# Patient Record
(demographics unavailable — no encounter records)

---

## 2024-11-19 NOTE — ASSESSMENT
[FreeTextEntry1] :   The EKG illustrates sinus rhythm, rate of 85 bpm, occasional ectopic ventricular beats, left atrial enlargement pattern, low voltage bipolar leads.  No acute changes;  In summary, patient is a very pleasant 66-year-old woman who came to our office for cardiac evaluation over 1 year ago with history of hypertension, heart murmur, mild mitral valve prolapse, and longstanding history of rheumatoid arthritis as well as chronic obesity.    She had been treated for hyperlipidemia and there is some family history for stroke and heart disease for one sister who  at an early age of an apparent MI.    Pt. ambulates with a cane because of a history of rheumatoid arthritis and bilateral knee arthritis.  At one time, she was prone to falling and now uses a cane.  She is trying to be more cognizant of her diet; but still having trouble losing any meaningful weight;  Recent pharmacologic myocardial perfusion stress test (2024) -showed normal myocardial perfusion without any evidence of ischemia or defects with preserved left ventricular systolic ejection fraction in the range of 66%;  Presently, she appears cardiac and hemodynamically stable;    Plan:  Patient reassured;  Appears to have stable cardiac pattern at this time and stable blood pressure on current medical regimen.;  No additional cardiac workup indicated at this time;  Counseled patient again on importance of trying to adhere to a sensible low-carb low-sodium weight reducing diet;  Regular checkups and laboratory blood test with primary care encouraged;  Patient to report any untoward chest symptoms between now and next visit within 5 to 6 months;

## 2024-11-19 NOTE — PHYSICAL EXAM
[Well Developed] : well developed [No Acute Distress] : no acute distress [Obese] : obese [Normal Conjunctiva] : normal conjunctiva [Normal Venous Pressure] : normal venous pressure [No Carotid Bruit] : no carotid bruit [Normal S1, S2] : normal S1, S2 [No Rub] : no rub [No Gallop] : no gallop [Murmur] : murmur [Clear Lung Fields] : clear lung fields [Good Air Entry] : good air entry [No Respiratory Distress] : no respiratory distress  [Soft] : abdomen soft [Non Tender] : non-tender [No Masses/organomegaly] : no masses/organomegaly [Abnormal Gait] : abnormal gait [No Cyanosis] : no cyanosis [No Clubbing] : no clubbing [No Rash] : no rash [No Skin Lesions] : no skin lesions [Moves all extremities] : moves all extremities [No Focal Deficits] : no focal deficits [Normal Speech] : normal speech [Alert and Oriented] : alert and oriented [Normal memory] : normal memory [de-identified] : Obese white female ambulating with a cane; [de-identified] : Grade I/VI systolic murmur [de-identified] : Obese, nontender; [de-identified] : ambulates with cane [de-identified] : Wearing compression stockings B/L. Trace pretibial edema with L>R

## 2024-11-19 NOTE — HISTORY OF PRESENT ILLNESS
[FreeTextEntry1] : Looking forward at some point to visit her son and grandkids in [place];  Reports a recent laboratory blood test with primary care including lipid panel were "favorable";    Pharmacologic nuclear stress test from January 23, 2024 demonstrates-no symptoms of chest pain; normal myocardial perfusion without any significant evidence of reversible ischemia or defects-i.e. negative for ischemia; LVEF in the range of 66%;   Carotid Doppler (9/19/2023):  There is mild stenosis (1-19%) in the both the BERTA and LICA.  The vertebral arteries demonstrate antegrade flow bilaterally.   Transthoracic Echocardiogram (9/19/2023):  There is moderate LVH with normal LV systolic function; LVEF of 55 to 60%.  The basal inferoseptal segment and basal inferior segment are hypokinetic.  The left atrium is mildly dilated and right atrium is mildly dilated.  The ascending aorta, aortic arch and aortic root appear normal in size. Trace AR, TR, and MT.  Estimated pulmonary artery systolic pressure is 35 mmHg, consistent with mild pulmonary HTN.  There is no pericardial effusion noted.  Most recent fasting labs from (4/4/2023):  Total Cholesterol 192, , HDL 77, Triglycerides 66, Glucose 88, Potassium 4.5, Creatinine 0.78, AST 19, ALT 17.

## 2024-11-19 NOTE — REASON FOR VISIT
[FreeTextEntry1] : The patient is a very pleasant 65-year-old woman who came to our office for cardiac consultation last year with history of hypertension, heart murmur, mild mitral valve prolapse, and longstanding history of rheumatoid arthritis as well as chronic obesity.  (She was actually seen in our office approximately 12 years ago for general cardiac checkup)    She had been treated for hyperlipidemia and there is some family history for stroke and heart disease for one sister who  at an early age of an apparent MI.    She had been experiencing some swelling of the lower extremities earlier this year and for that reason, her PCP (Dr. Patrick Granados) had started her on Dyazide 37.5-25 mg daily.  She reports the combination of taking this medication and using compression stockings daily has helped considerably with improving her LE edema.   Patient also has been better with cutting back on sodium rich foods and keeping well hydrated.  Her blood pressure has also remained under good control.    She had undergone some cardiovascular testing including carotid duplex study and echocardiogram this past fall and more recently underwent a nuclear stress test with pharmacologic protocol.  She returns to the office to discuss those results;   She ambulates with a cane because of a history of rheumatoid arthritis and bilateral knee arthritis.  At one time, she was prone to falling and now uses a cane.    Patient reports feeling overall good and denies exertional chest pain, orthopnea, PND, palpitations, presyncope, syncope.

## 2025-05-01 NOTE — PHYSICAL EXAM
[Well Developed] : well developed [No Acute Distress] : no acute distress [Obese] : obese [Normal Conjunctiva] : normal conjunctiva [Normal Venous Pressure] : normal venous pressure [No Carotid Bruit] : no carotid bruit [Normal S1, S2] : normal S1, S2 [No Rub] : no rub [No Gallop] : no gallop [Murmur] : murmur [Clear Lung Fields] : clear lung fields [Good Air Entry] : good air entry [No Respiratory Distress] : no respiratory distress  [Soft] : abdomen soft [Non Tender] : non-tender [No Masses/organomegaly] : no masses/organomegaly [Abnormal Gait] : abnormal gait [No Cyanosis] : no cyanosis [No Clubbing] : no clubbing [No Rash] : no rash [No Skin Lesions] : no skin lesions [Moves all extremities] : moves all extremities [No Focal Deficits] : no focal deficits [Normal Speech] : normal speech [Alert and Oriented] : alert and oriented [Normal memory] : normal memory [de-identified] : Obese white female ambulating with a cane; [de-identified] : Grade I/VI systolic murmur [de-identified] : Obese, nontender; [de-identified] : ambulates with cane [de-identified] : Wearing compression stockings B/L. Trace pretibial edema with L>R

## 2025-05-01 NOTE — REASON FOR VISIT
[FreeTextEntry1] : The patient is a very pleasant 66-year-old woman with history of hypertension, heart murmur, mild mitral valve prolapse, and longstanding history of rheumatoid arthritis as well as obesity.  (She was actually seen in our office approximately >12 years ago for general cardiac checkup)    She had been treated for hyperlipidemia and there is some family history for stroke and heart disease for one sister who  at an early age of an apparent MI.    She had been experiencing some swelling of the lower extremities earlier this year and for that reason, her PCP (Dr. Patrick Granados) had started her on Dyazide 37.5-25 mg daily.  She reports the combination of taking this medication and using compression stockings daily has helped considerably with improving her LE edema.   Unfortunately however, she has gained additional weight over the winter and finding it difficult to reduce.  She was following a nutritional weight loss plan and recipe plan for eating healthy a few years back when she lost almost 40 pounds but has been negligent in going back to that program which she feels she really wants to try again; She had thoughts about trying a GLP-1 medication but states that she can ill afford it if it is not covered;  Patient also has been better with cutting back on sodium rich foods and keeping well hydrated.  Her blood pressure has also remained under good control.    She had undergone some cardiovascular testing including carotid duplex study and echocardiogram this past fall and more recently underwent a nuclear stress test with pharmacologic protocol.    She ambulates with a cane because of a history of rheumatoid arthritis and bilateral knee arthritis.  At one time, she was prone to falling and now uses a cane.  Just recently she had bilateral gel shot injections for both knees and seems to be feeling better for the moment and hopes she will get some sustained relief;  Patient reports feeling overall good and denies exertional chest pain, orthopnea, PND, palpitations, presyncope, syncope.

## 2025-05-01 NOTE — HISTORY OF PRESENT ILLNESS
[FreeTextEntry1] : Looking forward at some point to visit her son and grandkids in [place];  Reports a recent laboratory blood test with primary care including lipid panel were "favorable";    Pharmacologic nuclear stress test from January 23, 2024 demonstrates-no symptoms of chest pain; normal myocardial perfusion without any significant evidence of reversible ischemia or defects-i.e. negative for ischemia; LVEF in the range of 66%;   Carotid Doppler (9/19/2023):  There is mild stenosis (1-19%) in the both the BERTA and LICA.  The vertebral arteries demonstrate antegrade flow bilaterally.   Transthoracic Echocardiogram (9/19/2023):  There is moderate LVH with normal LV systolic function; LVEF of 55 to 60%.  The basal inferoseptal segment and basal inferior segment are hypokinetic.  The left atrium is mildly dilated and right atrium is mildly dilated.  The ascending aorta, aortic arch and aortic root appear normal in size. Trace AR, TR, and OK.  Estimated pulmonary artery systolic pressure is 35 mmHg, consistent with mild pulmonary HTN.  There is no pericardial effusion noted.

## 2025-05-01 NOTE — ASSESSMENT
[FreeTextEntry1] :   The EKG illustrates sinus rhythm, rate of 80 bpm, borderline nonspecific T wave changes left atrial enlargement pattern, low voltage bipolar leads.  No acute changes;  In summary, patient is a very pleasant 66-year-old woman who came to our office for cardiac evaluation over 1 year ago with history of hypertension, heart murmur, mild mitral valve prolapse, and longstanding history of rheumatoid arthritis as well as chronic obesity.    She had been treated for hyperlipidemia and there is some family history for stroke and heart disease for one sister who  at an early age of an apparent MI.    Pt. ambulates with a cane because of a history of rheumatoid arthritis and bilateral knee arthritis.  At one time, she was prone to falling and now uses a cane.  She is trying to be more cognizant of her diet; but still having trouble losing any meaningful weight;  Recent pharmacologic myocardial perfusion stress test (2024) -showed normal myocardial perfusion without any evidence of ischemia or defects with preserved left ventricular systolic ejection fraction in the range of 66%;  With the exception of additional weight gain and her obesity, she appears cardiac and hemodynamically stable;    Plan:  Patient reassured;  Appears to have stable cardiac pattern at this time and stable blood pressure on current medical regimen.;  No additional cardiac workup indicated at this time;  Counseled patient again on importance of trying to adhere to a sensible low-carb low-sodium weight reducing diet;  There would be no absolute cardiac contraindication to start GLP-1 type medication such as Mounjaro, Wegovy or Zepbound etc. to help achieve weight loss goals which would be for this patient significant health improvement for reducing cardiovascular risk and for the very least improving her joint pains and dysfunction of the lower extremities; (to discuss with primary care);  Regular checkups and laboratory blood test with primary care encouraged;  Patient to report any untoward chest symptoms between now and next visit within 5 to 6 months;